# Patient Record
Sex: FEMALE | Race: WHITE | URBAN - METROPOLITAN AREA
[De-identification: names, ages, dates, MRNs, and addresses within clinical notes are randomized per-mention and may not be internally consistent; named-entity substitution may affect disease eponyms.]

---
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
## 2021-08-06 NOTE — PATIENT PROFILE PEDIATRIC. - NS PRO MODE OF ARRIVAL
%, Z= 1.42)*   04/07/21 26 lb 11 oz (12.1 kg) (99 %, Z= 2.26)*   01/22/21 25 lb (11.3 kg) (99 %, Z= 2.28)*     * Growth percentiles are based on WHO (Girls, 0-2 years) data. Ht Readings from Last 3 Encounters:   08/06/21 32.5\" (82.6 cm) (88 %, Z= 1.16)*   04/07/21 30.5\" (77.5 cm) (85 %, Z= 1.02)*   01/22/21 29.13\" (74 cm) (82 %, Z= 0.92)*     * Growth percentiles are based on WHO (Girls, 0-2 years) data. HC Readings from Last 3 Encounters:   08/06/21 48 cm (18.9\") (93 %, Z= 1.46)*   04/07/21 47.5 cm (18.7\") (96 %, Z= 1.78)*   01/22/21 45.7 cm (18\") (85 %, Z= 1.06)*     * Growth percentiles are based on WHO (Girls, 0-2 years) data. General:   alert, appears stated age and cooperative   Skin:   normal   Head:   normal appearance and supple neck   Eyes:   sclerae white, pupils equal and reactive, red reflex normal bilaterally   Ears:   normal bilaterally   Mouth:   No perioral or gingival cyanosis or lesions. Tongue is normal in appearance. Lungs:   clear to auscultation bilaterally   Heart:   regular rate and rhythm, S1, S2 normal, no murmur, click, rub or gallop   Abdomen:   soft, non-tender; bowel sounds normal; no masses,  no organomegaly   Screening DDH:   Ortolani's and Cheung's signs absent bilaterally, leg length symmetrical and thigh & gluteal folds symmetrical   :   normal female   Femoral pulses:   present bilaterally   Extremities:   extremities normal, atraumatic, no cyanosis or edema   Neuro:   alert, gait normal         Assessment:     1. Encounter for routine child health examination without abnormal findings    2. Need for Hib vaccination    3. Need for vaccination for DTaP         Plan:      1. Anticipatory guidance: Specific topics reviewed: importance of varied diet, car seat issues, including proper placement & transition to toddler seat at 20 pounds and \"child-proofing\" home with cabinet locks, outlet plugs, window guards and stair safety gate.      2.   Orders Placed This Encounter   Procedures    DTaP (age 6w-6y) IM (Infanrix)    Hib PRP-T - 4 dose (age 2m-5y) IM (ActHIB)         3. Follow-up visit in 3 months for next well child visit, or sooner as needed.           Electronically signed by Todd Bowman MD on 8/6/2021 at 1:26 PM stretcher

## 2022-03-01 NOTE — ED PEDIATRIC NURSE NOTE - COMFORT/ACCEPTABLE PAIN LEVEL (0-10)
Bernice Caballero from [facility] seating and mobility called to check if we received standard written order for power [facility] and medical necessity form that she faxed to [phone] on 2/3/22 and 2/11/22  I cannot locate this forms  Bernice Caballero will re-fax to backline [phone]      Awaiting fax                [phone]  Fax [phone]
Paperwork received  Placed in clerical bin to be scanned and emailed to you      thanks
Received a call from Carol Pettit asking if we could refax the papers we faxed over on Friday because she only received one page  Dr Yanira Phelps do you still have them?
3

## 2022-12-27 NOTE — CONSULT NOTE PEDS - ATTENDING COMMENTS
I have seen and examined this patient and agree with above.  16 y o F who presented 5 days ago with RLQ abdominal pain and 5 cm ovarian, likely hemorraghic cyst. She was seen in ED and take care of by GYN, who thought this was hem cyst and sent home with close followup.  The pain has persisted and she was told by GYN to come in and be seen for likely laparoscopy.  On exam, Libertad is in no distress, looks fine. abdomen is soft and nondistended, mildly tender RLQ.  normal WBC.  U/S pending.  U/S from 3/22: 5 cm cyst on right; likely hemorraghic  This is likely a hemorraghic ovarian cyst. GYN to perform laparoscopy.  We will be available for any unforeseen gen surg issues.  Discussed with mom. - - -

## 2024-04-18 NOTE — ED PEDIATRIC NURSE NOTE - CAS TRG GEN SKIN CONDITION
sent a message to pt's primary care office to make contact with pt to schedule her hospital f/u visit.   Warm

## 2024-12-13 NOTE — ED ADULT TRIAGE NOTE - INTERNATIONAL TRAVEL
12/12/24 1955   Patient Assessment/Suction   Level of Consciousness (AVPU) alert   Respiratory Effort Normal;Unlabored   Expansion/Accessory Muscles/Retractions no retractions;no use of accessory muscles   All Lung Fields Breath Sounds Anterior:;Lateral:;crackles;diminished   Rhythm/Pattern, Respiratory no shortness of breath reported;unlabored   PRE-TX-O2   Device (Oxygen Therapy) nasal cannula   Flow (L/min) (Oxygen Therapy) 2   SpO2 99 %   Pulse Oximetry Type Continuous   $ Pulse Oximetry - Multiple Charge Pulse Oximetry - Multiple   Pulse 60   Resp 19   Positioning HOB elevated 30 degrees   Preset CPAP/BiPAP Settings   Mode Of Delivery BiPAP;standby   CPAP/BIPAP charged w/in last 24 h YES   $ Is patient using? No/refused   Education   $ Education Oxygen;BiPAP;15 min       
No

## 2025-05-19 NOTE — PATIENT PROFILE PEDIATRIC. - NS CRAFFT PART A2 ALCOHOL
Patient was fitted with a left, size large form fit wrist brace. Equipment given and patient instructed in use. Signature on dme form obtained.       No